# Patient Record
Sex: MALE | Race: WHITE | Employment: FULL TIME | URBAN - METROPOLITAN AREA
[De-identification: names, ages, dates, MRNs, and addresses within clinical notes are randomized per-mention and may not be internally consistent; named-entity substitution may affect disease eponyms.]

---

## 2024-06-27 ENCOUNTER — HOSPITAL ENCOUNTER (OUTPATIENT)
Age: 79
Setting detail: OUTPATIENT SURGERY
Discharge: HOME | End: 2024-06-29
Attending: INTERNAL MEDICINE
Payer: COMMERCIAL

## 2024-06-27 VITALS
HEART RATE: 66 BPM | DIASTOLIC BLOOD PRESSURE: 68 MMHG | TEMPERATURE: 97.7 F | BODY MASS INDEX: 34.78 KG/M2 | HEIGHT: 74 IN | OXYGEN SATURATION: 98 % | SYSTOLIC BLOOD PRESSURE: 148 MMHG | WEIGHT: 271 LBS | RESPIRATION RATE: 12 BRPM

## 2024-06-27 DIAGNOSIS — I48.0 PAROXYSMAL ATRIAL FIBRILLATION (HCC): ICD-10-CM

## 2024-06-27 LAB
ECHO BSA: 2.53 M2
GLUCOSE BLD-MCNC: 142 MG/DL (ref 75–110)
GLUCOSE BLD-MCNC: 77 MG/DL (ref 75–110)
POC INR: 2.4
PROTHROMBIN TIME, POC: 28.2 SEC (ref 10.4–14.2)

## 2024-06-27 PROCEDURE — 6360000002 HC RX W HCPCS: Performed by: INTERNAL MEDICINE

## 2024-06-27 PROCEDURE — 93005 ELECTROCARDIOGRAM TRACING: CPT | Performed by: STUDENT IN AN ORGANIZED HEALTH CARE EDUCATION/TRAINING PROGRAM

## 2024-06-27 PROCEDURE — 92960 CARDIOVERSION ELECTRIC EXT: CPT

## 2024-06-27 PROCEDURE — 82947 ASSAY GLUCOSE BLOOD QUANT: CPT

## 2024-06-27 PROCEDURE — 99222 1ST HOSP IP/OBS MODERATE 55: CPT | Performed by: INTERNAL MEDICINE

## 2024-06-27 PROCEDURE — 85610 PROTHROMBIN TIME: CPT

## 2024-06-27 PROCEDURE — 2580000003 HC RX 258: Performed by: INTERNAL MEDICINE

## 2024-06-27 PROCEDURE — 7100000010 HC PHASE II RECOVERY - FIRST 15 MIN: Performed by: INTERNAL MEDICINE

## 2024-06-27 PROCEDURE — 92960 CARDIOVERSION ELECTRIC EXT: CPT | Performed by: INTERNAL MEDICINE

## 2024-06-27 PROCEDURE — 93005 ELECTROCARDIOGRAM TRACING: CPT | Performed by: INTERNAL MEDICINE

## 2024-06-27 PROCEDURE — 7100000011 HC PHASE II RECOVERY - ADDTL 15 MIN: Performed by: INTERNAL MEDICINE

## 2024-06-27 PROCEDURE — 2500000003 HC RX 250 WO HCPCS: Performed by: INTERNAL MEDICINE

## 2024-06-27 RX ORDER — SODIUM CHLORIDE 9 MG/ML
INJECTION, SOLUTION INTRAVENOUS CONTINUOUS
Status: DISCONTINUED | OUTPATIENT
Start: 2024-06-27 | End: 2024-06-30 | Stop reason: HOSPADM

## 2024-06-27 RX ORDER — DEXTROSE MONOHYDRATE 25 G/50ML
25 INJECTION, SOLUTION INTRAVENOUS ONCE
Status: COMPLETED | OUTPATIENT
Start: 2024-06-27 | End: 2024-06-27

## 2024-06-27 RX ORDER — FENTANYL CITRATE 50 UG/ML
INJECTION, SOLUTION INTRAMUSCULAR; INTRAVENOUS PRN
Status: COMPLETED | OUTPATIENT
Start: 2024-06-27 | End: 2024-06-27

## 2024-06-27 RX ORDER — MIDAZOLAM HYDROCHLORIDE 1 MG/ML
INJECTION INTRAMUSCULAR; INTRAVENOUS PRN
Status: COMPLETED | OUTPATIENT
Start: 2024-06-27 | End: 2024-06-27

## 2024-06-27 RX ORDER — ATORVASTATIN CALCIUM 10 MG/1
10 TABLET, FILM COATED ORAL DAILY
COMMUNITY

## 2024-06-27 RX ADMIN — DEXTROSE MONOHYDRATE 25 G: 25 INJECTION, SOLUTION INTRAVENOUS at 07:33

## 2024-06-27 RX ADMIN — FENTANYL CITRATE 50 MCG: 50 INJECTION, SOLUTION INTRAMUSCULAR; INTRAVENOUS at 09:38

## 2024-06-27 RX ADMIN — MIDAZOLAM 2 MG: 1 INJECTION INTRAMUSCULAR; INTRAVENOUS at 09:38

## 2024-06-27 RX ADMIN — PROPOFOL 40 MG: 10 INJECTION, EMULSION INTRAVENOUS at 09:42

## 2024-06-27 RX ADMIN — SODIUM CHLORIDE: 9 INJECTION, SOLUTION INTRAVENOUS at 07:33

## 2024-06-27 ASSESSMENT — PAIN - FUNCTIONAL ASSESSMENT: PAIN_FUNCTIONAL_ASSESSMENT: NONE - DENIES PAIN

## 2024-06-27 NOTE — PROGRESS NOTES
Received post cardioversion procedure to [facility] room 2. Assessment obtained. Restrictions reviewed with patient. Post procedure pathway initiated.   Patient without complaints.

## 2024-06-27 NOTE — PROGRESS NOTES
TRANSFER - OUT REPORT:    Verbal report given to Jyotsna GREENE on Carlos Caruso being transferred to [facility] (unit) for routine post-op       Report consisted of patient's Situation, Background, Assessment and   Recommendations(SBAR).     Information from the following report(s) Nurse Handoff Report was reviewed with the receiving nurse.    Opportunity for questions and clarification was provided.      Patient transported with:   Monitor

## 2024-06-27 NOTE — PROCEDURES
[facility]  Cardioversion procedure Note         Today's Date: 6/27/2024    Primary/Ordering Cardiologist:     Indication:     Pre Procedure Conscious Sedation Data:    ASA Class:    [] I [x] II [] III [] IV    Mallampati Class:  [] I [x] II [] III [] IV    Patient seen and examined. History and Physical reviewed. Labs reviewed.    After informed consent was obtained with explanation of the risks and benefits, the patient was prepared using standard tecqniques.     All Conscious Sedation was administered via the Cardiologist.     CARDIOVERSION:    After an adequate level of sedation was achieved  {PROC CARDIOVERSION ENERGY LEVEL:76815}J in biphasic synchronized delivery was administered.   {PROC CARDIOVERSION POST RHYTHM ED:49168}.     The patient awoke without complications. A post procedure 12 L ECG was ordered and reviewed.    The patient will continue with the same medications.   Long term care and cardiovascular management    Impression:  Successful Consious Sedation - safely  Successful Cardioversion    Complications:  There were no complications encountered.    Electronically signed by Servando Stark MD on 6/27/2024 at 10:15 AM      [facility]  [phone]

## 2024-06-27 NOTE — PROCEDURES
[facility]  Cardioversion Procedure Note         Today's Date: 6/27/2024  Primary/Ordering Cardiologist: Servando Stark MD  Indication: PAF    Patient seen and examined. History and Physical reviewed. Labs reviewed.    After informed consent was obtained with explanation of the risks and benefits, the patient was prepared using standard tecqniques.     All Conscious Sedation was administered via the Cardiologist.     CARDIOVERSION:    After an adequate level of sedation was achieved, 200J in biphasic synchronized delivery was administered with no change in rhythm.     After an adequate level of sedation was achieved, 360 J in biphasic synchronized delivery was administered with conversion to sinus bradycardia with 1st degree AV block.     The patient awoke without complications. A post procedure 12 L ECG was ordered and reviewed.      Impression:  Successful Consious Sedation - safely  Successful Cardioversion      Complications:  There were no complications encountered.      The patient will continue with the discharge meds and has been instructed to follow-up with Dr. Servando Stark MD for continued long term care and cardiovascular management. There were no complications encountered.      Electronically signed on 06/27/24 at 9:46 AM by:    Ekaterina Pritchard MD, MD   Fellow, Cardiovascular Diseases  [facility]    Attending Physician Statement  I have discussed the case of Carlos Caruso including pertinent history and exam findings with the student/resident/fellow. I have seen and examined the patient and the key elements of the encounter have been performed by me. I agree with the assessment, plan and orders as documented by the resident With changes made to the note.  I was present during entire procedure and performed all critical elements of the procedure    Electronically signed by Servando Stark MD on 6/27/2024 at 11:20 AM.    [facility]

## 2024-06-27 NOTE — H&P
Obesity. I again discussed with him diet and exercise, caloric restriction, and moderate physical activity in order to lose weight.     6. Follow up in 2 months.    Plan:  Proceed with planned procedure.  Further orders to follow.      Risks, benefits, alternatives, and details discussed extensively. Accepts and consents.      Electronically signed on 06/27/24 at 8:45 AM by:    Ekaterina Pritchard MD, MD   Fellow, Cardiovascular Diseases  [facility]      Attending Physician Statement  I have discussed the case of Carlos Caruso including pertinent history and exam findings with the student/resident/fellow. I have seen and examined the patient and the key elements of the encounter have been performed by me. I agree with the assessment, plan and orders as documented by the resident With changes made to the note.     Electronically signed by Servando Stark MD on 6/27/2024 at 10:13 AM.    [facility]      [phone]

## 2024-06-27 NOTE — DISCHARGE INSTRUCTIONS
foods.  Limit alcohol to 2 drinks a day for men and 1 drink a day for women.  Activity  If your doctor recommends it, get more exercise. Walking is a good choice. Bit by bit, increase the amount you walk every day. Try for 30 minutes on most days of the week. You also may want to swim, bike, or do other activities.  When you exercise, watch for signs that your heart is working too hard. You are pushing too hard if you cannot talk while you are exercising. If you become short of breath or dizzy or have chest pain, sit down and rest immediately.  Check your pulse regularly. Place two fingers on the artery at the palm side of your wrist in line with your thumb. If your heartbeat seems uneven or fast, talk to your doctor.  When should you call for help?  Call 911 anytime you think you may need emergency care. For example, call if:  You have trouble breathing.  You passed out (lost consciousness).  You cough up pink, foamy mucus and you have trouble breathing.  You have symptoms of a heart attack. These may include:  Chest pain or pressure, or a strange feeling in the chest.  Sweating.  Shortness of breath.  Nausea or vomiting.  Pain, pressure, or a strange feeling in the back, neck, jaw, or upper belly or in one or both shoulders or arms.  Lightheadedness or sudden weakness.  A fast or irregular heartbeat.  After you call 911, the  may tell you to chew 1 adult-strength or 2 to 4 low-dose aspirin. Wait for an ambulance. Do not try to drive yourself.  You have symptoms of a stroke. These may include:  Sudden numbness, tingling, weakness, or loss of movement in your face, arm, or leg, especially on only one side of your body.  Sudden vision changes.  Sudden trouble speaking.  Sudden confusion or trouble understanding simple statements.  Sudden problems with walking or balance.  A sudden, severe headache that is different from past headaches.  Call your doctor now or seek immediate medical care if:  You have new or

## 2024-06-28 LAB
EKG ATRIAL RATE: 227 BPM
EKG ATRIAL RATE: 59 BPM
EKG P AXIS: 79 DEGREES
EKG P-R INTERVAL: 528 MS
EKG Q-T INTERVAL: 474 MS
EKG Q-T INTERVAL: 488 MS
EKG QRS DURATION: 104 MS
EKG QRS DURATION: 120 MS
EKG QTC CALCULATION (BAZETT): 449 MS
EKG QTC CALCULATION (BAZETT): 483 MS
EKG R AXIS: 16 DEGREES
EKG R AXIS: 34 DEGREES
EKG T AXIS: 83 DEGREES
EKG T AXIS: 98 DEGREES
EKG VENTRICULAR RATE: 54 BPM
EKG VENTRICULAR RATE: 59 BPM